# Patient Record
Sex: FEMALE | URBAN - METROPOLITAN AREA
[De-identification: names, ages, dates, MRNs, and addresses within clinical notes are randomized per-mention and may not be internally consistent; named-entity substitution may affect disease eponyms.]

---

## 2017-02-24 ENCOUNTER — TELEPHONE (OUTPATIENT)
Dept: NURSING | Facility: CLINIC | Age: 82
End: 2017-02-24

## 2017-02-24 NOTE — TELEPHONE ENCOUNTER
"Call Type: Triage Call    Presenting Problem: patient thinks she has a \"urinary tract  infection.\" patient is having with urination, frequency and burning,  along with blood in urine. Triaged for bloody urine/Disposition to  see provider within 4 hours.  Triage Note:  Guideline Title: Bloody Urine ; Urinary Symptoms - Female  Recommended Disposition: See Provider within 4 hours  Original Inclination: Wanted to speak with a nurse  Override Disposition:  Intended Action: See Dr/Marvel Appt  Physician Contacted: No  Passing blood clots with urination ?  YES  New or worsening signs and symptoms that may indicate shock ? NO  Injury to flank area or abdomen AND visible mass/swelling; bruise-like  discoloration in flank area, or above pubic bone; or pure bloody urine ? NO  Unbearable flank, low back or lower abdominal pain ? NO  Any temperature elevation in an immunocompromised individual OR frail elderly with  signs of dehydration ? NO  Current or recent urinary tract instrumentation or surgery AND has a fever 101.5 F  (38.6 C) or higher, is unable to urinate OR is having more bleeding OR bleeding  is lasting longer than expected as defined by provider's follow-up precautions ?  NO  Any other unexpected urinary symptoms following urinary tract or abdominal surgery  within timeframe specified by provider ? NO  New or worsening signs and symptoms that may indicate shock ? NO  Unbearable abdominal/pelvic pain ? NO  Physician Instructions:  Care Advice: Another adult should drive.  CAUTIONS  Call provider immediately if urination is painful or decreased amount of  urine output.  SYMPTOM / CONDITION MANAGEMENT  List, or take, all current prescription(s), nonprescription or alternative  medication(s) to provider for evaluation.  Total water intake includes drinking water, water in beverages, and water  contained in food. Fluids make up about 80% of the body's total hydration  need. Individual fluid requirement to maintain " hydration vary based on  physical activity, environmental factors and illness. Limit fluids that  contain sugar, caffeine, or alcohol. Urine will be very light yellow color  when you drink enough fluids.

## 2017-02-28 ENCOUNTER — TELEPHONE (OUTPATIENT)
Dept: NURSING | Facility: CLINIC | Age: 82
End: 2017-02-28

## 2017-02-28 NOTE — TELEPHONE ENCOUNTER
Call Type: Triage Call    Presenting Problem: Taking generic Augmentin for UTI  Has had five  full days. She hasn't felt quite right since she started taking  this.  She feels shaky.  This a.m. she felt very light headed after  taking her augmentin and being up most of the night last night  urinating.  She drank about 16 oz of water when the lighheadedness  came on knowing she hasn't been drinking enough fluids.  She felt  fine again after this.  She stated the sx's she had when UTI was  diagnosed improved almost immediately.  She'd like to stop the  Augmentin at this point.  She's going to speak with a pharmacist  about the sx's she's having as she thinks it's related to the  Augmentin.  She has a little stomach upset and loose stools.  She  just buried her  2/25/2017.  She's had a very stressful last  three weeks.  She know's stress can be affecting her physically,  too.  Janeen is going to call the clinic in the a.m. about stopping  the Augmentin.  Triage Note:  Guideline Title: Medication Questions - Adult  Recommended Disposition: Speak with Provider or Pharmacist  within 24 hours  Original Inclination: Wanted to speak with a nurse  Override Disposition:  Intended Action: Follow advice given  Physician Contacted: No  Has questions about prescribed and/or nonprescribed medications not covered by  available resources ?  YES  Pregnant and has medication questions regarding prescribed and/or nonprescribed  medication(s) not covered by available resources ? NO  Breastfeeding and has medication questions regarding prescribed and/or  nonprescribed medication(s) not covered by available resources ? NO  Sign(s) or symptom(s) associated with a diagnosed condition or with a new illness  ? NO  Prescription ordered today and not available at pharmacy putting patient at  clinical risk ? NO  Recurrence of a symptom(s) or illness post prescribed medication treatment AND  provider instructed patient to call if  symptom(s) returned. ? NO  Unable to obtain prescribed medication related to available resources AND  situation poses immediate clinical risk ? NO  Pharmacy calling to clarify prescription order. ? NO  Requests refill of prescribed medication that does NOT have a valid refill; lack  of medication may cause clinical risk to patient if not available. ? NO  Requests refill of prescribed medication without valid refills OR requests refill  of prescribed medication with valid refills but does not have prescription number  (no RX container); lack of medication does not put patient at clinical risk ? NO  Physician Instructions:  Care Advice:

## 2024-08-10 ENCOUNTER — NURSE TRIAGE (OUTPATIENT)
Dept: NURSING | Facility: CLINIC | Age: 89
End: 2024-08-10

## 2024-08-10 NOTE — TELEPHONE ENCOUNTER
Fell yesterday onto left side.  Can move left arm.   Hurts to breathe.  Pain rated 5/10.  Using ice and Tylenol.  Per the protocol, I recommended that she be seen within 24 hrs.  Caller stated understanding and agreement.  Will go to [facility], today.        Reason for Disposition   [1] MODERATE pain (e.g., interferes with normal activities) AND [2] high-risk adult (e.g., age > 60 years, osteoporosis, chronic steroid use)    Additional Information   Negative: Major injury from dangerous force or speed (e.g., MVA, fall > 10 feet or 3 meters)   Negative: Bullet wound, knife wound, or other penetrating object   Negative: Puncture wound that sounds life-threatening to the triager   Negative: SEVERE difficulty breathing (e.g., struggling for each breath, speaks in single words)   Negative: [1] Major bleeding (e.g., actively dripping or spurting) AND [2] can't be stopped   Negative: Open wound of the chest with sound of moving air (sucking wound) or visible air bubbles   Negative: Shock suspected (e.g., cold/pale/clammy skin, too weak to stand, low BP, rapid pulse)   Negative: Coughing or spitting up blood   Negative: Bluish (or gray) lips or face now   Negative: Unconscious or was unconscious   Negative: Sounds like a life-threatening emergency to the triager   Negative: [1] Injuries at more than 1 site AND [2] unsure which guideline to use   Negative: Chest pain not from an injury OR cause is unknown   Negative: Wound looks infected   Negative: SEVERE chest pain   Negative: [1] Difficulty breathing AND [2] not severe   Negative: Skin is split open or gaping   Negative: [1] Bleeding AND [2] won't stop after 10 minutes of direct pressure (using correct technique)   Negative: Sounds like a serious injury to the triager   Negative: [1] Can't take a deep breath BUT [2] no respiratory distress   Negative: Shallow puncture wound   Negative: [1] Collarbone is painful AND [2] difficulty raising arm   Negative: Suspicious  history for the injury   Negative: Patient is confused or is an unreliable provider of information (e.g., dementia, severe intellectual disability, alcohol intoxication)    Protocols used: Chest Injury-A-AH  Shaina HUNTER RN [facility]